# Patient Record
Sex: MALE | Race: WHITE | NOT HISPANIC OR LATINO | Employment: FULL TIME | URBAN - METROPOLITAN AREA
[De-identification: names, ages, dates, MRNs, and addresses within clinical notes are randomized per-mention and may not be internally consistent; named-entity substitution may affect disease eponyms.]

---

## 2020-10-23 ENCOUNTER — OFFICE VISIT (OUTPATIENT)
Dept: ORTHOPEDIC SURGERY | Facility: CLINIC | Age: 50
End: 2020-10-23

## 2020-10-23 VITALS
WEIGHT: 253 LBS | BODY MASS INDEX: 37.47 KG/M2 | HEIGHT: 69 IN | HEART RATE: 125 BPM | DIASTOLIC BLOOD PRESSURE: 112 MMHG | SYSTOLIC BLOOD PRESSURE: 152 MMHG

## 2020-10-23 DIAGNOSIS — S81.012A LACERATION OF LEFT KNEE, INITIAL ENCOUNTER: Primary | ICD-10-CM

## 2020-10-23 DIAGNOSIS — M25.562 ACUTE PAIN OF LEFT KNEE: ICD-10-CM

## 2020-10-23 PROCEDURE — 99203 OFFICE O/P NEW LOW 30 MIN: CPT | Performed by: ORTHOPAEDIC SURGERY

## 2020-10-23 NOTE — PROGRESS NOTES
General Exam    Patient: Inder Lazar    YOB: 1970    Medical Record Number: [id number]    Chief Complaints: Knee laceration    History of Present Illness:     50 y.o. male patient who presents for evaluation and management of left knee laceration.  Patient got out of bed 3 nights ago tripped and fell cut his knee on a plate.  He went to the ER in [place] as he was doing some work in the area he received stitches antibiotics and knee immobilizer was instructed to follow-up with orthopedics.  Patient states his knee immobilizer falls down and his incision is draining some does not appear to be infected per patient he is up taken his antibiotics Keflex 500 mg 4 times a day for a week.  He is able to full weight-bear and straight leg raise per the patient.  Denies numbness or tingling.  No joint pain.  No knee swelling.    Denies any numbness or tingling.  Denies any fevers, cough or shortness of breath.    Allergies: No Known Allergies    Home Medications:    No current outpatient medications on file.    History reviewed. No pertinent past medical history.    History reviewed. No pertinent surgical history.    Social History     Occupational History   • Not on file   Tobacco Use   • Smoking status: Current Every Day Smoker     Packs/day: 1.50     Types: Cigarettes   • Smokeless tobacco: Never Used   Substance and Sexual Activity   • Alcohol use: Yes   • Drug use: Defer   • Sexual activity: Defer      Social History     Social History Narrative   • Not on file       Family History   Problem Relation Age of Onset   • Atrial fibrillation Mother    • Factor V Leiden deficiency Father        Review of Systems:      Constitutional: Denies fever, shaking or chills   Eyes: Denies change in visual acuity   HEENT: Denies nasal congestion or sore throat   Respiratory: Denies cough or shortness of breath   Cardiovascular: Denies chest pain or edema  Endocrine: Denies tremors, palpitations, intolerance of heat or  "cold, polyuria, polydipsia.  GI: Denies abdominal pain, nausea, vomiting, bloody stools or diarrhea  : Denies frequency, urgency, incontinence, retention, or nocturia.  Musculoskeletal: Denies numbness, tingling or loss of motor function except as above  Integument: Denies rash, lesion or ulceration   Neurologic: Denies headache or focal weakness, deficits  Heme: Denies spontaneous or excessive bleeding, epistaxis, hematuria, melena, fatigue, enlarged or tender lymph nodes.      All other pertinent positives and negatives as noted above in HPI.    Physical Exam: 50 y.o. male    Vitals:    10/23/20 1502   BP: (!) 152/112   Pulse: (!) 125   Weight: 115 kg (253 lb)   Height: 175.3 cm (69\")       General:  Patient is awake and alert.  Appears in no acute distress or discomfort.    Psych:  Affect and demeanor are appropriate.    Eyes:  Conjunctiva and sclera appear grossly normal.  Eyes track well and EOM seem to be intact.    Ears:  No gross abnormalities.  Hearing adequate for the exam.    Cardiovascular:  Regular rate and rhythm.    Lungs:  Good chest expansion.  Breathing unlabored.    Lymph:  No palpable masses or adenopathy in the affected extremity    Musculoskeletal/Extremities:    Left lower extremity; 20 cm knee laceration across and anterior aspect of the knee.  Sutures are in place.  One suture has come undone.  No apparent drainage.  Minimal redness and erythema.  No knee joint swelling no tenderness to the knee joint.  Patient able to wiggle toes ankles up and down.  Sensation is intact distally light touch.  2+ pedal pulses.  Calf soft compressible.         Radiology:       AP and lateral left knee taken in the office and reviewed to evaluate the patient's complaint/s.    No acute fractures appreciated.     No imaging for comparison.    Assessment:      Left knee laceration superficial      Plan:      I discussed imaging and clinical findings with patient.  Given that one of the sutures came undone it " was removed replaced with 2 Steri-Strips.  Dressed the incision site and showed him how to place the knee immobilizer correctly.  Patient was instructed to weight-bear as tolerated with knee immobilizer over the next 2 weeks.  Patient is instructed to continue to finish out his antibiotics.  If the redness I continue to prove gets worse or drainage continues instructed to call for reevaluation.  Steri-Strips remain in place for least next week additional Steri-Strips were given.  Instructed patient follow-up in 2 weeks for suture removal and further evaluation.  Patient to be off work for least 1 week then may return just for light duty.  All questions were answered patient understood and agrees with plan.               Clovis Quinteros MD    10/23/2020    CC to Provider, No Known

## 2020-10-30 ENCOUNTER — OFFICE VISIT (OUTPATIENT)
Dept: ORTHOPEDIC SURGERY | Facility: CLINIC | Age: 50
End: 2020-10-30

## 2020-10-30 VITALS
HEIGHT: 69 IN | DIASTOLIC BLOOD PRESSURE: 88 MMHG | WEIGHT: 258 LBS | HEART RATE: 112 BPM | SYSTOLIC BLOOD PRESSURE: 134 MMHG | BODY MASS INDEX: 38.21 KG/M2

## 2020-10-30 DIAGNOSIS — S81.012D LACERATION OF LEFT KNEE, SUBSEQUENT ENCOUNTER: Primary | ICD-10-CM

## 2020-10-30 PROCEDURE — 99213 OFFICE O/P EST LOW 20 MIN: CPT | Performed by: ORTHOPAEDIC SURGERY

## 2020-10-30 NOTE — PROGRESS NOTES
General Exam    Patient: Inder Lazar    YOB: 1970    Medical Record Number: [id number]    Chief Complaints: Left knee wound check    History of Present Illness:     50 y.o. male patient who presents for evaluation treatment of left knee wound check.  Patient was seen 1 week ago and had his left knee laceration evaluated.  He reports he has finished his antibiotics denies any drainage.  He has been keeping his knee straight in an immobilizer.  He states he will be back in [place] this coming week and will be there for a prolonged period time.  He denies any new issues.    Denies any numbness or tingling.  Denies any fevers, cough or shortness of breath.    Allergies: No Known Allergies    Home Medications:    No current outpatient medications on file.    History reviewed. No pertinent past medical history.    History reviewed. No pertinent surgical history.    Social History     Occupational History   • Not on file   Tobacco Use   • Smoking status: Current Every Day Smoker     Packs/day: 1.50     Types: Cigarettes   • Smokeless tobacco: Never Used   Substance and Sexual Activity   • Alcohol use: Yes   • Drug use: Defer   • Sexual activity: Defer      Social History     Social History Narrative   • Not on file       Family History   Problem Relation Age of Onset   • Atrial fibrillation Mother    • Factor V Leiden deficiency Father        Review of Systems:      Constitutional: Denies fever, shaking or chills   Eyes: Denies change in visual acuity   HEENT: Denies nasal congestion or sore throat   Respiratory: Denies cough or shortness of breath   Cardiovascular: Denies chest pain or edema  Endocrine: Denies tremors, palpitations, intolerance of heat or cold, polyuria, polydipsia.  GI: Denies abdominal pain, nausea, vomiting, bloody stools or diarrhea  : Denies frequency, urgency, incontinence, retention, or nocturia.  Musculoskeletal: Denies numbness, tingling or loss of motor function except as  "above  Integument: Denies rash, lesion or ulceration   Neurologic: Denies headache or focal weakness, deficits  Heme: Denies spontaneous or excessive bleeding, epistaxis, hematuria, melena, fatigue, enlarged or tender lymph nodes.      All other pertinent positives and negatives as noted above in HPI.    Physical Exam: 50 y.o. male    Vitals:    10/30/20 0941   BP: 134/88   Pulse: 112   Weight: 117 kg (258 lb)   Height: 175.3 cm (69\")       General:  Patient is awake and alert.  Appears in no acute distress or discomfort.    Psych:  Affect and demeanor are appropriate.    Eyes:  Conjunctiva and sclera appear grossly normal.  Eyes track well and EOM seem to be intact.    Ears:  No gross abnormalities.  Hearing adequate for the exam.    Cardiovascular:  Regular rate and rhythm.    Lungs:  Good chest expansion.  Breathing unlabored.    Lymph:  No palpable masses or adenopathy in the affected extremity    Musculoskeletal/Extremities:    Left lower extremity.  Laceration appears to be healing well minimal erythema no gross signs of infection no drainage.  Plan to remove sutures play Steri-Strips.  No knee tenderness or swelling.  Patient is able to range his knee 0-30 with no pain.  He is neurovascular intact.  2+ pedal pulses Compressible.             Assessment: Left knee laceration      Plan:      Told patient that we will remove his sutures placed Steri-Strips such as remain in place for 10 days.  After 10 days they may come off her pillow off the shower.  Additional Steri-Strips were given in case once pulled down.  Instructed him to continue his knee immobilizer when up for the next week.  While at rest over this next week he may remove his knee immobilizer and only gently bend his knee if needed for comfort.  After 1 week he may discontinue the immobilizer and start bending his knee starting at 30 degrees and then progressing each week for 30 degrees.  Alignment in his knee more than 90 degrees for the next 2 " weeks at least.  He may shower soap and water no soaking in tub pat the incision dry.  I instructed if he has any issues or wound problems to let me know is like to see him back however he stated he will be in [facility] and there is no orthopedics near where he works.  I told him that is fine to follow-up somewhere else if needed but if any issues arise I would recommend getting it looked at by medical professional.        We will plan for follow up as needed.    All questions were answered.  Patient understands and agrees with the plan.    Clovis Quinteros MD    10/30/2020    CC to Provider, No Known